# Patient Record
Sex: MALE | Race: WHITE | Employment: UNEMPLOYED | URBAN - NONMETROPOLITAN AREA
[De-identification: names, ages, dates, MRNs, and addresses within clinical notes are randomized per-mention and may not be internally consistent; named-entity substitution may affect disease eponyms.]

---

## 2018-11-30 ENCOUNTER — APPOINTMENT (OUTPATIENT)
Dept: GENERAL RADIOLOGY | Facility: HOSPITAL | Age: 28
End: 2018-11-30
Attending: NURSE PRACTITIONER
Payer: COMMERCIAL

## 2018-11-30 ENCOUNTER — HOSPITAL ENCOUNTER (EMERGENCY)
Facility: HOSPITAL | Age: 28
Discharge: HOME | End: 2018-11-30
Attending: NURSE PRACTITIONER | Admitting: NURSE PRACTITIONER
Payer: COMMERCIAL

## 2018-11-30 VITALS
OXYGEN SATURATION: 96 % | WEIGHT: 165 LBS | RESPIRATION RATE: 16 BRPM | TEMPERATURE: 97.2 F | DIASTOLIC BLOOD PRESSURE: 71 MMHG | SYSTOLIC BLOOD PRESSURE: 120 MMHG

## 2018-11-30 DIAGNOSIS — S93.105A: ICD-10-CM

## 2018-11-30 PROCEDURE — 73660 X-RAY EXAM OF TOE(S): CPT | Mod: TC,LT

## 2018-11-30 PROCEDURE — 28515 TREATMENT OF TOE FRACTURE: CPT

## 2018-11-30 PROCEDURE — 28515 TREATMENT OF TOE FRACTURE: CPT | Performed by: NURSE PRACTITIONER

## 2018-11-30 PROCEDURE — 40000986 XR TOE LT G/E 2 VW: Mod: TC,LT

## 2018-11-30 PROCEDURE — 40000268 ZZH STATISTIC NO CHARGES

## 2018-11-30 ASSESSMENT — ENCOUNTER SYMPTOMS
APPETITE CHANGE: 0
TROUBLE SWALLOWING: 0
COUGH: 0
DYSURIA: 0
ACTIVITY CHANGE: 0
WEAKNESS: 0
NUMBNESS: 0
FEVER: 0
PSYCHIATRIC NEGATIVE: 1

## 2018-11-30 NOTE — ED AVS SNAPSHOT
[facility]    [address] [place] [address]    Phone:  [phone]                                       Edvin Hamilton   MRN: [id number]    Department:  [facility]   Date of Visit:  11/30/2018           After Visit Summary Signature Page     I have received my discharge instructions, and my questions have been answered. I have discussed any challenges I see with this plan with the nurse or doctor.    ..........................................................................................................................................  Patient/Patient Representative Signature      ..........................................................................................................................................  Patient Representative Print Name and Relationship to Patient    ..................................................               ................................................  Date                                   Time    ..........................................................................................................................................  Reviewed by Signature/Title    ...................................................              ..............................................  Date                                               Time          22EPIC Rev 08/18

## 2018-11-30 NOTE — ED AVS SNAPSHOT
[facility]    [address] [address]    Phone:  [phone]                                       Edvin Hamilton   MRN: [id number]    Department:  [facility]   Date of Visit:  11/30/2018           Patient Information     Date Of Birth          1990        Your diagnoses for this visit were:     Dislocation of toe of left foot, initial encounter Reduction done.       You were seen by Anamika Tao NP.      Follow-up Information     Follow up with Tristin Crain MD In 10 days.    Specialty:  Family Practice    Why:  For re-evaluation.     Contact information:    [facility]  [address] [facility] [address]  [phone]          Follow up with [facility].    Specialty:  EMERGENCY MEDICINE    Why:  As needed, If symptoms worsen    Contact information:    [address] [address] [phone]    Additional information:    From [facility]: Take [address]. Turn left at [address]. Turn left at the first stoplight on [address]. At the first stop sign, take a right onto [address]. Take a left into the parking lot and continue through until you reach the North enterance of the building.       From [place]: Take [address]. Take the [address] ramp towards [place]. Turn left onto [address]. Take a slight right onto [address]. Turn left at the first stoplight on [address]. At the first stop sign, take a right onto [address]. Take a left into the parking lot and continue through until you reach the North enterance of the building.       From [place]: Take [address]. Take a right at [address]. At the first stop sign, take a right onto [address]. Take a left into the parking lot and continue through until you reach the North enterance of the building.         Discharge Instructions       Take tylenol and/or ibuprofen  "for pain. Follow dosing on package.   Apply ice to left toe for 20 minutes every 1-2 hours. Protect skin.   Elevate left leg as much as able.   See RICE handout.   Wear ace wrap and ortho shoe to left foot while walking. Take off when resting.   Keep toes katiana taped for 2 weeks. Replace tape after showering.   Follow up with PCP in 10 days.   Return to urgent care or emergency department with any increase in symptoms or concerns.       Discharge References/Attachments     DISLOCATION, TOE (ENGLISH)    RICE (ENGLISH)         Review of your medicines      Notice     You have not been prescribed any medications.            Procedures and tests performed during your visit     XR Toe Left 2 Views    XR Toe Left G/E 2 Views      Orders Needing Specimen Collection     None      Pending Results     Date and Time Order Name Status Description    11/30/2018 2023 XR Toe Left 2 Views In process             Pending Culture Results     No orders found from 11/28/2018 to 12/1/2018.            Thank you for choosing [facility]       Thank you for choosing [facility] for your care. Our goal is always to provide you with excellent care. Hearing back from our patients is one way we can continue to improve our services. Please take a few minutes to complete the written survey that you may receive in the mail after you visit with us. Thank you!        [employer] Information     [employer] lets you send messages to your doctor, view your test results, renew your prescriptions, schedule appointments and more. To sign up, go to [email or web address] . Click on \"Log in\" on the left side of the screen, which will take you to the Welcome page. Then click on \"Sign up Now\" on the right side of the page.     You will be asked to enter the access code listed below, as well as some personal information. Please follow the directions to create your username and password.     Your access code is: [email or web address]  Expires: 2/28/2019  8:37 PM     Your access " code will  in 90 days. If you need help or a new code, please call your [facility] clinic or [phone].        Care EveryWhere ID     This is your Care EveryWhere ID. This could be used by other organizations to access your [facility] medical records  [id number]        Equal Access to Services     [address]LE[address] : Hadii aad ku hadasho Sokathleenali, waaxda luqadaha, qaybta kaalmada david, melinda capps. So [facility] [phone].    ATENCIÓN: Si habla español, tiene a ricks disposición servicios gratuitos de asistencia lingüística. Llame al [phone].    We comply with applicable federal civil rights laws and [place] laws. We do not discriminate on the basis of race, color, national origin, age, disability, sex, sexual orientation, or gender identity.            After Visit Summary       This is your record. Keep this with you and show to your community pharmacist(s) and doctor(s) at your next visit.

## 2018-12-01 NOTE — ED PROVIDER NOTES
History     Chief Complaint   Patient presents with     Toe Injury     The history is provided by the patient. No  was used.     Edvin Hamilton is a 28 year old male who presents with left toe pain. He slipped on ice today and developed pain to 2nd toe on left foot with deformity. He's applied ice with mild effectiveness. Eating and drinking well. Bowel and bladder are working well. He can bear weight on left foot.     Problem List:    There are no active problems to display for this patient.       Past Medical History:    History reviewed. No pertinent past medical history.    Past Surgical History:    No past surgical history on file.    Family History:    No family history on file.    Social History:  Marital Status:   [2]  Social History   Substance Use Topics     Smoking status: Not on file     Smokeless tobacco: Not on file     Alcohol use Not on file        Medications:      No current outpatient prescriptions on file.      Review of Systems   Constitutional: Negative for activity change, appetite change and fever.   HENT: Negative for trouble swallowing.    Respiratory: Negative for cough.    Genitourinary: Negative for dysuria.   Musculoskeletal:        Left 2nd toe pain.    Skin: Negative for rash.   Neurological: Negative for weakness and numbness.        Denies numbness or tingling in left foot.    Psychiatric/Behavioral: Negative.        Physical Exam   BP: 120/71  Heart Rate: 84  Temp: 97.2  F (36.2  C)  Resp: 16  Weight: 74.8 kg (165 lb)  SpO2: 96 %      Physical Exam   Constitutional: He is oriented to person, place, and time. He appears well-developed and well-nourished. No distress.   HENT:   Head: Normocephalic.   Mouth/Throat: Oropharynx is clear and moist.   Neck: Normal range of motion. Neck supple.   Cardiovascular: Normal rate, regular rhythm and intact distal pulses.    No murmur heard.  Pulmonary/Chest: Effort normal. No respiratory distress. He has no wheezes.  He has no rales.   Abdominal: Soft. He exhibits no distension.   Musculoskeletal: He exhibits edema and tenderness. He exhibits no deformity.   CMS and ROM intact to left lower extremity. Left dorsalis pedis +2. Extension and flexion intact to all digits on left foot except 2nd toe appears dislocated.    Neurological: He is alert and oriented to person, place, and time. He displays normal reflexes. He exhibits normal muscle tone.   Skin: Skin is warm and dry. No rash noted. He is not diaphoretic.   Psychiatric: He has a normal mood and affect. His behavior is normal.   Nursing note and vitals reviewed.      ED Course     ED Course     Procedures     Pre XRAY  I personally reviewed the x-rays and there is 2nd toe dislocation. No fracture. Radiology review pending and nurse will notify patient if there is any change in the treatment plan.    Results for orders placed or performed during the hospital encounter of 11/30/18   XR Toe Left G/E 2 Views    Narrative    XR TOE LT G/E 2 VW    HISTORY: 28 yearsMale Pain after slipping on ice.;     TECHNIQUE: 2 views left toe    COMPARISON: None    FINDINGS: There is dislocation of the second proximal interphalangeal  articulation.    There is no evidence of fracture or dislocation otherwise.      Impression    IMPRESSION: Second proximal interphalangeal dislocation.    NANCY GARZA MD       Post Reduction XRAY  I personally reviewed the x-rays and there is NO fracture or dislocation. Radiology review pending and nurse will notify patient if there is any change in the treatment plan.    Discussed reduction methods with block versus manual pull without block. He elected for me to manually reduce dislocation without block. I used a forward pull technique to left 2nd toe and felt a pop. 2nd toe is straight. Peng taped toe to 3rd toe on left foot. Post reduction XRAY ordered.     Assessments & Plan (with Medical Decision Making)     Discussed plan of care. He verbalized  understanding. All questions answered.     I have reviewed the nursing notes.    I have reviewed the findings, diagnosis, plan and need for follow up with the patient.  Discharged in stable condition.     New Prescriptions    No medications on file       Final diagnoses:   Dislocation of toe of left foot, initial encounter - Reduction done.     Take tylenol and/or ibuprofen for pain. Follow dosing on package.   Apply ice to left toe for 20 minutes every 1-2 hours. Protect skin.   Elevate left leg as much as able.   See RICE handout.   Wear ace wrap and ortho shoe to left foot while walking. Take off when resting.   Keep toes katiana taped for 2 weeks. Replace tape after showering.   Follow up with PCP in 10 days.   Return to urgent care or emergency department with any increase in symptoms or concerns.     VALERI Mccartney  11/30/2018  8:02 PM  [facility]       Aanmika Tao NP  11/30/18 2044

## 2018-12-01 NOTE — ED TRIAGE NOTES
"Pt states he slipped off 1 icy step and \"I broke my 2nd toe\". Denies hitting head. Left foot 2nd toe obviously malpositioned.  "

## 2018-12-01 NOTE — DISCHARGE INSTRUCTIONS
Take tylenol and/or ibuprofen for pain. Follow dosing on package.   Apply ice to left toe for 20 minutes every 1-2 hours. Protect skin.   Elevate left leg as much as able.   See RICE handout.   Wear ace wrap and ortho shoe to left foot while walking. Take off when resting.   Keep toes katiana taped for 2 weeks. Replace tape after showering.   Follow up with PCP in 10 days.   Return to urgent care or emergency department with any increase in symptoms or concerns.     
Abdomen soft, non-tender and non-distended without organomegaly or masses. Normal bowel sounds.

## 2020-07-05 ENCOUNTER — APPOINTMENT (OUTPATIENT)
Dept: GENERAL RADIOLOGY | Facility: HOSPITAL | Age: 30
End: 2020-07-05
Attending: PHYSICIAN ASSISTANT

## 2020-07-05 ENCOUNTER — HOSPITAL ENCOUNTER (EMERGENCY)
Facility: HOSPITAL | Age: 30
Discharge: HOME | End: 2020-07-05
Attending: PHYSICIAN ASSISTANT | Admitting: PHYSICIAN ASSISTANT

## 2020-07-05 ENCOUNTER — TRANSFERRED RECORDS (OUTPATIENT)
Dept: HEALTH INFORMATION MANAGEMENT | Facility: CLINIC | Age: 30
End: 2020-07-05

## 2020-07-05 VITALS
OXYGEN SATURATION: 97 % | TEMPERATURE: 97.9 F | RESPIRATION RATE: 14 BRPM | DIASTOLIC BLOOD PRESSURE: 96 MMHG | SYSTOLIC BLOOD PRESSURE: 139 MMHG

## 2020-07-05 DIAGNOSIS — S91.222A: ICD-10-CM

## 2020-07-05 PROCEDURE — 25000132 ZZH RX MED GY IP 250 OP 250 PS 637: Performed by: PHYSICIAN ASSISTANT

## 2020-07-05 PROCEDURE — 25000128 H RX IP 250 OP 636: Performed by: PHYSICIAN ASSISTANT

## 2020-07-05 PROCEDURE — 90471 IMMUNIZATION ADMIN: CPT

## 2020-07-05 PROCEDURE — 99213 OFFICE O/P EST LOW 20 MIN: CPT | Mod: Z6 | Performed by: PHYSICIAN ASSISTANT

## 2020-07-05 PROCEDURE — 25000125 ZZHC RX 250: Performed by: PHYSICIAN ASSISTANT

## 2020-07-05 PROCEDURE — 73660 X-RAY EXAM OF TOE(S): CPT | Mod: TC,LT

## 2020-07-05 PROCEDURE — 96372 THER/PROPH/DIAG INJ SC/IM: CPT

## 2020-07-05 PROCEDURE — 90715 TDAP VACCINE 7 YRS/> IM: CPT | Performed by: PHYSICIAN ASSISTANT

## 2020-07-05 PROCEDURE — G0463 HOSPITAL OUTPT CLINIC VISIT: HCPCS | Mod: 25

## 2020-07-05 RX ORDER — CEFTRIAXONE SODIUM 1 G
1 VIAL (EA) INJECTION ONCE
Status: COMPLETED | OUTPATIENT
Start: 2020-07-05 | End: 2020-07-05

## 2020-07-05 RX ORDER — IBUPROFEN 800 MG/1
800 TABLET, FILM COATED ORAL ONCE
Status: COMPLETED | OUTPATIENT
Start: 2020-07-05 | End: 2020-07-05

## 2020-07-05 RX ADMIN — LIDOCAINE HYDROCHLORIDE 1 G: 10 INJECTION, SOLUTION EPIDURAL; INFILTRATION; INTRACAUDAL; PERINEURAL at 15:05

## 2020-07-05 RX ADMIN — CLOSTRIDIUM TETANI TOXOID ANTIGEN (FORMALDEHYDE INACTIVATED), CORYNEBACTERIUM DIPHTHERIAE TOXOID ANTIGEN (FORMALDEHYDE INACTIVATED), BORDETELLA PERTUSSIS TOXOID ANTIGEN (GLUTARALDEHYDE INACTIVATED), BORDETELLA PERTUSSIS FILAMENTOUS HEMAGGLUTININ ANTIGEN (FORMALDEHYDE INACTIVATED), BORDETELLA PERTUSSIS PERTACTIN ANTIGEN, AND BORDETELLA PERTUSSIS FIMBRIAE 2/3 ANTIGEN 0.5 ML: 5; 2; 2.5; 5; 3; 5 INJECTION, SUSPENSION INTRAMUSCULAR at 14:14

## 2020-07-05 RX ADMIN — IBUPROFEN 800 MG: 800 TABLET ORAL at 15:27

## 2020-07-05 NOTE — ED NOTES
Crystal to [facility] has been notified of pt on his way via private vehicle   Report given by provider

## 2020-07-05 NOTE — ED TRIAGE NOTES
Pt is here with left foot pain   Onset today   Pt notes he was playing volleyball and now the top of his foot is bleeding   Left foot big toe presents with laceration  Pt currently soaking in hibiclens

## 2020-07-05 NOTE — ED PROVIDER NOTES
History     Chief Complaint   Patient presents with     Toe Pain     left big toe yesterday. last tetanus 2008 while in the       HPI  Edvin Hamilton is a 30 year old male who presents here with a left great toe injury.  He has a laceration that is about 3 cm going from the dorsal aspect of his toe all the way down to the pad of the toe.  He has diminished strength with extension.  Denies any numbness or tingling.  He has good cap refill.  He does require tetanus update today.  He dove playing volleyball and somehow injured it.    Allergies:  Allergies   Allergen Reactions     Seasonal Allergies        Problem List:    There are no active problems to display for this patient.       Past Medical History:    No past medical history on file.    Past Surgical History:    No past surgical history on file.    Family History:    No family history on file.    Social History:  Marital Status:   [2]  Social History     Tobacco Use     Smoking status: Not on file   Substance Use Topics     Alcohol use: Not on file     Drug use: Not on file        Medications:    No current outpatient medications on file.        Review of Systems  Per HPI  Physical Exam   BP: 139/96  Heart Rate: 101  Temp: 97.9  F (36.6  C)  Resp: 14  SpO2: 97 %      Physical Exam  Vital signs reviewed nurse's notes reviewed on examination this is a 30-year-old male no acute distress this time nontoxic appearance.  Examination of his left great toe he has a large laceration 3 cm going across the dorsal aspect down to the pad it is gaping and is heavily soiled C grass and dirt in the wound.  He has diminished strength with trying to extend the great toe.  Sensation cap refill appears to be intact.  ED Course     I anesthetized with via digital block with 4 cc of lidocaine 1% 1 cc obtained nurse irrigated I then examined explored wound still a gracilis I did remove particles he had quite a bit of dirt in my concern hours I think he has a complete  ligament disruption because of my open wound you can see that the distal phalangeal synovitis and appears to involve the joint as well.    He was given a gram of Rocephin  mg ibuprofen is given a tetanus update.  X-rays were reviewed and are negative.  I did call and speak with Dr. Roque at [facility] who is agreed to take this patient be transferred via private car to the ER there for further evaluation my suspicion he will need surgical washout and surgical repair         Results for orders placed or performed during the hospital encounter of 07/05/20 (from the past 24 hour(s))   XR Toe Left G/E 2 Views    Narrative    PROCEDURE:  XR TOE LT G/E 2 VW    HISTORY: injury ? open fracture    COMPARISON:  2018    TECHNIQUE:  2 views of the left toes were obtained.    FINDINGS:  No fracture or dislocation is identified. The joint spaces  are preserved.        Impression    IMPRESSION: No acute fracture.      SAM MOLINA MD       Medications   Tdap (tetanus-diphtheria-acell pertussis) (ADACEL) injection 0.5 mL (0.5 mLs Intramuscular Given 7/5/20 1414)   cefTRIAXone (ROCEPHIN) 1 g in lidocaine (PF) (XYLOCAINE) 1 % injection (1 g Intramuscular Given 7/5/20 1505)       Assessments & Plan (with Medical Decision Making)     I have reviewed the nursing notes.    I have reviewed the findings, diagnosis, plan and need for follow up with the patient.      New Prescriptions    No medications on file       Final diagnoses:   Laceration of left great toe with foreign body and damage to nail, initial encounter       7/5/2020   [facility]

## 2025-04-14 ENCOUNTER — APPOINTMENT (OUTPATIENT)
Dept: GENERAL RADIOLOGY | Facility: HOSPITAL | Age: 35
End: 2025-04-14
Payer: COMMERCIAL

## 2025-04-14 ENCOUNTER — HOSPITAL ENCOUNTER (EMERGENCY)
Facility: HOSPITAL | Age: 35
Discharge: HOME | End: 2025-04-14
Payer: COMMERCIAL

## 2025-04-14 VITALS
SYSTOLIC BLOOD PRESSURE: 142 MMHG | RESPIRATION RATE: 18 BRPM | TEMPERATURE: 96.9 F | HEART RATE: 66 BPM | DIASTOLIC BLOOD PRESSURE: 98 MMHG | OXYGEN SATURATION: 97 %

## 2025-04-14 DIAGNOSIS — S20.211A RIB CONTUSION, RIGHT, INITIAL ENCOUNTER: ICD-10-CM

## 2025-04-14 PROCEDURE — 99213 OFFICE O/P EST LOW 20 MIN: CPT

## 2025-04-14 PROCEDURE — 71101 X-RAY EXAM UNILAT RIBS/CHEST: CPT | Mod: 26 | Performed by: RADIOLOGY

## 2025-04-14 PROCEDURE — G0463 HOSPITAL OUTPT CLINIC VISIT: HCPCS

## 2025-04-14 PROCEDURE — 71101 X-RAY EXAM UNILAT RIBS/CHEST: CPT | Mod: RT

## 2025-04-14 RX ORDER — LIDOCAINE 4 G/G
1 PATCH TOPICAL EVERY 24 HOURS
Qty: 7 PATCH | Refills: 0 | Status: SHIPPED | OUTPATIENT
Start: 2025-04-14 | End: 2025-04-21

## 2025-04-14 ASSESSMENT — ENCOUNTER SYMPTOMS
DIARRHEA: 0
VOMITING: 0
APPETITE CHANGE: 0
SHORTNESS OF BREATH: 1
NAUSEA: 0
ABDOMINAL PAIN: 0
ACTIVITY CHANGE: 0
FEVER: 0

## 2025-04-14 NOTE — ED PROVIDER NOTES
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 Incident occurred yesterday. No chest pressure. Increased pain with deep breaths and moving. Ibuprofen this AM with mild improvement in pain. He does not take blood thinners.     Allergies:  Allergies   Allergen Reactions    Seasonal Allergies        Problem List:    There are no active problems to display for this patient.       Past Medical History:    No past medical history on file.    Past Surgical History:    No past surgical history on file.    Family History:    No family history on file.    Social History:  Marital Status:   [2]        Medications:    Lidocaine (LIDOCARE) 4 % Patch          Review of Systems   Constitutional:  Negative for activity change, appetite change and fever.   Respiratory:  Positive for shortness of breath (with deep breaths).    Cardiovascular:  Positive for chest pain (due to injury).   Gastrointestinal:  Negative for abdominal pain, diarrhea, nausea and vomiting.   All other systems reviewed and are negative.      Physical Exam   BP: (!) 142/98  Pulse: 66  Temp: 96.9  F (36.1  C)  Resp: 18  SpO2: 97 %      Physical Exam  Vitals and nursing note reviewed.   Constitutional:       General: He is not in acute distress.     Appearance: Normal appearance. He is not ill-appearing or toxic-appearing.   Cardiovascular:      Rate and Rhythm: Normal rate and regular rhythm.      Pulses: Normal pulses.      Heart sounds: Normal heart sounds. No murmur heard.  Pulmonary:      Effort: Pulmonary effort is normal.      Breath sounds: Normal breath sounds. No wheezing, rhonchi or rales.   Chest:      Chest wall: Tenderness present.       Abdominal:      General: Abdomen is flat.      Palpations: Abdomen is soft.       Tenderness: There is no abdominal tenderness.   Skin:     Findings: Bruising present.   Neurological:      Mental Status: He is alert.         ED Course        Procedures       Results for orders placed or performed during the hospital encounter of 04/14/25 (from the past 24 hours)   Ribs XR, unilat 3 views + PA chest, right    Narrative    XR RIBS & CHEST RT 3VW, 4/14/2025 8:36 AM    History: Male, age 35 years; anterior pain radiating laterally    Comparison: No relevant prior imaging.    Technique: Single view of the chest and Two views of the right ribs.    FINDINGS: The cardiac silhouette is within normal limits. The  pulmonary vasculature is within normal limits. Evaluation of the ribs  demonstrates no fracture.      Impression    IMPRESSION:   Clear lungs. No acute abnormality.    No acute fracture nor evidence of pneumothorax.    AREN GURROLA MD         SYSTEM ID:  [id number]       Medications - No data to display    Assessments & Plan (with Medical Decision Making)     I have reviewed the nursing notes.    I have reviewed the findings, diagnosis, plan and need for follow up with the patient.  Edvin Hamilton is a 35 year old male who presents to the urgent care with complaints of lower right anterior rib pain that radiates laterally after hitting a goalie and getting hit with a puck. Incident occurred yesterday. No chest pressure. Increased pain with deep breaths and moving. Ibuprofen this AM with mild improvement in pain. He does not take blood thinners.     MDM: afebrile. Non toxic in appearance with no noted distress. Lungs clear, heart tones regular. Able to speak in complete sentences without dyspnea. Anterior tenderness with ~6cm area of bruising over rib area. No palpable crepitus. No abd tenderness. CXR reviewed with no acute rib fracture or evidence of pneumatothorax. Lidocaine patches prescribed. Supportive measures and return precautions discussed. He is in agreement with plan.      (S20.453K)  Rib contusion, right, initial encounter  Plan: You can take 650-1000mg of tylenol every 6 hours as needed, max of 3000mg in 24 hours and 600-800mg of ibuprofen every 8 hours as needed, max of 2400mg in 24 hours.     Lidocaine patches can be worn for 12 hours. You must be patch free for 12 hours before applying a new patch.     Ice for 15-20 minutes every 2-3 hours. Please make sure to protect skin to prevent frost bite.     Return with any new or concerning symptoms.   Follow up in the clinic as needed. Understanding verbalized.       New Prescriptions    LIDOCAINE (LIDOCARE) 4 % PATCH    Place 1 patch over 12 hours onto the skin every 24 hours for 7 days. To prevent lidocaine toxicity, patient should be patch free for 12 hrs daily.       Final diagnoses:   Rib contusion, right, initial encounter       4/14/2025   [facility]       Briana Peterson NP  04/14/25 0946

## 2025-04-14 NOTE — DISCHARGE INSTRUCTIONS
You can take 650-1000mg of tylenol every 6 hours as needed, max of 3000mg in 24 hours and 600-800mg of ibuprofen every 8 hours as needed, max of 2400mg in 24 hours.     Lidocaine patches can be worn for 12 hours. You must be patch free for 12 hours before applying a new patch.     Ice for 15-20 minutes every 2-3 hours. Please make sure to protect skin to prevent frost bite.     Return with any new or concerning symptoms.   Follow up in the clinic as needed.

## 2025-04-14 NOTE — ED TRIAGE NOTES
"Pt presents with concern of rib pain. Pt stated \"I hit a goaly yesterday and my ribs are all bruised up\"          "

## 2025-08-06 ENCOUNTER — APPOINTMENT (OUTPATIENT)
Dept: OCCUPATIONAL MEDICINE | Facility: OTHER | Age: 35
End: 2025-08-06

## 2025-08-06 ENCOUNTER — APPOINTMENT (OUTPATIENT)
Dept: CHIROPRACTIC MEDICINE | Facility: OTHER | Age: 35
End: 2025-08-06